# Patient Record
Sex: MALE | Race: WHITE
[De-identification: names, ages, dates, MRNs, and addresses within clinical notes are randomized per-mention and may not be internally consistent; named-entity substitution may affect disease eponyms.]

---

## 2023-04-14 ENCOUNTER — HOSPITAL ENCOUNTER (EMERGENCY)
Dept: HOSPITAL 94 - ER | Age: 43
LOS: 5 days | Discharge: PSYCHIATRIC OR SUBSTANCE USE FACILITY | End: 2023-04-19
Payer: COMMERCIAL

## 2023-04-14 VITALS — BODY MASS INDEX: 30.47 KG/M2 | HEIGHT: 66 IN | WEIGHT: 189.6 LBS

## 2023-04-14 DIAGNOSIS — F20.9: Primary | ICD-10-CM

## 2023-04-14 DIAGNOSIS — Z20.822: ICD-10-CM

## 2023-04-14 LAB
ALBUMIN SERPL BCP-MCNC: 3.7 G/DL (ref 3.4–5)
ALBUMIN/GLOB SERPL: 1.1 {RATIO} (ref 1.1–1.5)
ALP SERPL-CCNC: 81 IU/L (ref 46–116)
ALT SERPL W P-5'-P-CCNC: 20 U/L (ref 12–78)
ANION GAP SERPL CALCULATED.3IONS-SCNC: 8 MMOL/L (ref 8–16)
AST SERPL W P-5'-P-CCNC: 20 U/L (ref 10–37)
BASOPHILS # BLD AUTO: 0.1 X10'3 (ref 0–0.2)
BASOPHILS NFR BLD AUTO: 0.6 % (ref 0–1)
BILIRUB SERPL-MCNC: 0.2 MG/DL (ref 0.1–1)
BUN SERPL-MCNC: 9 MG/DL (ref 7–18)
BUN/CREAT SERPL: 12.5 (ref 10–20)
CALCIUM SERPL-MCNC: 8.7 MG/DL (ref 8.5–10.1)
CHLORIDE SERPL-SCNC: 105 MMOL/L (ref 99–107)
CO2 SERPL-SCNC: 26.7 MMOL/L (ref 24–32)
CREAT SERPL-MCNC: 0.72 MG/DL (ref 0.6–1.1)
EOSINOPHIL # BLD AUTO: 0.1 X10'3 (ref 0–0.9)
EOSINOPHIL NFR BLD AUTO: 0.8 % (ref 0–6)
ERYTHROCYTE [DISTWIDTH] IN BLOOD BY AUTOMATED COUNT: 14.2 % (ref 11.5–14.5)
ETHANOL SERPL-MCNC: < 0.01 GM/DL (ref 0–0.01)
GFR SERPL CREATININE-BSD FRML MDRD: > 90 ML/MIN
GLUCOSE SERPL-MCNC: 107 MG/DL (ref 70–104)
HCT VFR BLD AUTO: 40.9 % (ref 42–52)
HGB BLD-MCNC: 14.1 G/DL (ref 14–17.9)
LYMPHOCYTES # BLD AUTO: 2.3 X10'3 (ref 1.1–4.8)
LYMPHOCYTES NFR BLD AUTO: 24.1 % (ref 21–51)
MCH RBC QN AUTO: 31.6 PG (ref 27–31)
MCHC RBC AUTO-ENTMCNC: 34.5 G/DL (ref 33–36.5)
MCV RBC AUTO: 91.7 FL (ref 78–98)
MONOCYTES # BLD AUTO: 0.6 X10'3 (ref 0–0.9)
MONOCYTES NFR BLD AUTO: 6.1 % (ref 2–12)
NEUTROPHILS # BLD AUTO: 6.5 X10'3 (ref 1.8–7.7)
NEUTROPHILS NFR BLD AUTO: 68.4 % (ref 42–75)
PLATELET # BLD AUTO: 230 X10'3 (ref 140–440)
PMV BLD AUTO: 7.7 FL (ref 7.4–10.4)
POTASSIUM SERPL-SCNC: 3.7 MMOL/L (ref 3.5–5.1)
PROT SERPL-MCNC: 7 G/DL (ref 6.4–8.2)
RBC # BLD AUTO: 4.46 X10'6 (ref 4.7–6.1)
SODIUM SERPL-SCNC: 140 MMOL/L (ref 135–145)
WBC # BLD AUTO: 9.4 X10'3 (ref 4.5–11)

## 2023-04-14 PROCEDURE — 84443 ASSAY THYROID STIM HORMONE: CPT

## 2023-04-14 PROCEDURE — 85025 COMPLETE CBC W/AUTO DIFF WBC: CPT

## 2023-04-14 PROCEDURE — 80305 DRUG TEST PRSMV DIR OPT OBS: CPT

## 2023-04-14 PROCEDURE — 99285 EMERGENCY DEPT VISIT HI MDM: CPT

## 2023-04-14 PROCEDURE — 87811 SARS-COV-2 COVID19 W/OPTIC: CPT

## 2023-04-14 PROCEDURE — 36415 COLL VENOUS BLD VENIPUNCTURE: CPT

## 2023-04-14 PROCEDURE — 80053 COMPREHEN METABOLIC PANEL: CPT

## 2023-04-14 PROCEDURE — 96372 THER/PROPH/DIAG INJ SC/IM: CPT

## 2023-04-14 PROCEDURE — 80320 DRUG SCREEN QUANTALCOHOLS: CPT

## 2023-04-14 PROCEDURE — 81003 URINALYSIS AUTO W/O SCOPE: CPT

## 2023-04-14 NOTE — NUR
PT BECOMING EXTREEMLY AGGITATED BECAUSE HE IS NOT BEING ALOWED TO GO OUT AND 
HAVE A CIGARETT.  IT WAS EXPLAINED THAT THIS IS A NON-SMOKING HOSPITAL, PT 
BEGAN YELLING, KICKING GURLISETTE AND THREW HIS SUNGLASSES AT ONE OF THE EMTS THAT 
BROUGHT HIM IN.



OBTAINED AN ORDER FOR MEDICATION; PROVIDER ORDERED A B502 AND IT WAS 
ADMINISTERED

## 2023-04-15 LAB
AMPHETAMINES UR QL SCN: NEGATIVE
BARBITURATES UR QL SCN: NEGATIVE
BENZODIAZ UR QL SCN: NEGATIVE
BZE UR QL SCN: NEGATIVE
CANNABINOIDS UR QL SCN: NEGATIVE
CLARITY UR: CLEAR
COLOR UR: (no result)
GLUCOSE UR STRIP-MCNC: NEGATIVE MG/DL
HGB UR QL STRIP: NEGATIVE
KETONES UR STRIP-MCNC: NEGATIVE MG/DL
LEUKOCYTE ESTERASE UR QL STRIP: NEGATIVE
METHADONE UR QL SCN: NEGATIVE
NITRITE UR QL STRIP: NEGATIVE
OPIATES UR QL SCN: NEGATIVE
PCP UR QL SCN: NEGATIVE
PH UR STRIP: 7.5 [PH] (ref 4.8–8)
PROT UR QL STRIP: NEGATIVE MG/DL
SP GR UR STRIP: 1.01 (ref 1–1.03)
URN COLLECT METHOD CLASS: (no result)
UROBILINOGEN UR STRIP-MCNC: 0.2 E.U/DL (ref 0.2–1)

## 2023-04-15 RX ADMIN — DOCUSATE SODIUM SCH MG: 100 CAPSULE, LIQUID FILLED ORAL at 20:11

## 2023-04-15 NOTE — NUR
pt requested for someone to check his belongings to see if his red belt and his 
wallet were in there.  Tech checked the bags of pt belongings.  Yenni was unable 
to locate said wallet and red belt.  Tech then called [facility] in an attempt 
to locate the red belt and wallet, gentleman that answered the phone stated 
that they had it and the pt's conservator would be able to pick it up upon pt 
departure back to their original county.

## 2023-04-15 NOTE — NUR
Pt got agitated stating "I asked for coffe and noone f***ing listens!" Writer 
showed him where his cup was and said he was sleeping and writer didn't want to 
wake him up. Pt stated "Oh, I will drink the coffee."

## 2023-04-15 NOTE — NUR
Spoke with Jennifer at [facility] to clarify pt's eMAR that was sent from facility. 
Pt's Lithium and Trazadone were discontinued r/t doctor was concerned with 
interaction with Haldol. Pt had been receiving mulitiple IM's of Haldol. Pt has 
a history seizures and refused Depakote, pt was then placed on Trileptal, which 
he had been taking at [facility]. Pt is now on Trileptal 450mg BID, however pt 
refused his morning dose saying "I only had grad mal's when I was taking the 
Zyprexa." "Since I don't take Zyprexa I don't need seizure medication."

## 2023-04-15 NOTE — NUR
[facility] at bedside, when the name of [facility] comes up pt becomes agitated "I am 
going to brandon that place!"

## 2023-04-15 NOTE — NUR
-------------------------------------------------------------------------------

           *** Note undone in [facility] - 04/15/23 at 1650 by BHARAT ***            

-------------------------------------------------------------------------------

Pt back on unit.

## 2023-04-15 NOTE — NUR
Received patient at 0700 to OF bed #25. Pt required security for escort r/t his 
agressive behavior about being woke up. Pt yelled for a short time frustrated 
"because they were going to give me more than 100mg of Seroquel!" "What the 
hell, they wanted me fall down!" Pt screamed "my god is here will come!" Pt 
then said "I am not going to hurt you or anyone else, I'm just not going to 
take more then 100mg of Seroquel."

## 2023-04-15 NOTE — NUR
Pt has random loud outbursts "When the rapture comes you all will pay the 
gong!" Pt is angry with [place] "They gave me Seroquel and I fell down on 
camera!" "I want a malpractice  and if I don't get one someone will pay!" 
Pt refused his breakfast yelling "That is poisoned!" However he did drink his 
coffee.

## 2023-04-15 NOTE — NUR
PATIENT SAID HE HAD TO PEE ,SO TECH GAVE HIM A URINAL BECAUSE WE NEEDED A 
SIMPLE NOT  AND THE PATIENT THROW IT ON THE FLOOR AND SAID HE WAS NOT GOING TO 
PEE IN AND THAT HE WAS GOING IN THE SINK .PATIENT STATED THAT HE WAS NOT GOING 
TO GIVE A SIMPLE

## 2023-04-15 NOTE — NUR
Pt cooperative with vitals. Asked about HS meds and laughed when writer told 
him it was only 1730.  No outbursts.

## 2023-04-15 NOTE — NUR
Pt became irritated at another Pt who was yelling a lot. Pt able to be 
redirected and did not escalate the situation. Pt took some of his HS meds, 
refusing the Seroquel 200mg and the trileptal. Pt remained in his bed area and 
attempted to rest.

## 2023-04-15 NOTE — NUR
Pt ate dinner and given cup of decaf coffee and he was smiling. Pt currently 
cooperative and not having loud outbursts. In bed resting.

## 2023-04-16 RX ADMIN — DOCUSATE SODIUM SCH MG: 100 CAPSULE, LIQUID FILLED ORAL at 20:34

## 2023-04-16 RX ADMIN — DOCUSATE SODIUM SCH MG: 100 CAPSULE, LIQUID FILLED ORAL at 08:00

## 2023-04-16 RX ADMIN — Medication SCH PACKET: at 12:44

## 2023-04-16 RX ADMIN — THERA TABS SCH EACH: TAB at 08:00

## 2023-04-16 NOTE — NUR
Assumed care of patient. Pt is lying on his bed with his arms pulled into his 
shirt. Pt appears to be sleeping, so writer observed. [place] placed over 
patient. Respirations even and unlabored.

## 2023-04-16 NOTE — NUR
Pt was restless, lying in bed when greeted. Pt took his melatonin and colace, 
stated "I will take my Seroquel in an hour, but only 100mg." Pt given coffee as 
requested, pt up to bathroom then back to sleep.

## 2023-04-16 NOTE — NUR
-------------------------------------------------------------------------------

          *** Note undone in ED - 04/16/23 at 0843 by KAMI ***           

-------------------------------------------------------------------------------

[facility] transport here to take pt to [facility].  Pt left with all 
belongings. Father accompanied pt to [facility]

## 2023-04-16 NOTE — NUR
Pt began talking about a med that previously caused him a seizure, and that he 
has the name of a  amongst his belongings.  He asked for his belongings 
and stated that he was going to leave.  I reminded him he is on a 5150 and that 
he can't leave. This upset him, and he slammed [employer]'s computer to the desk. 
Security and 2 ER main male staff som watt called and a B52 was obtained from 
Kizzy Nolasco NP.  However on arrival back into [facility], security had 
calmed pt down, and was no longer acting violent and so the B52 was not given.  
Pt had also refused his AM oral meds.  Pt currently at desk asking questions 
about why he is on a 5150, and how he can get a  to assist with getting 
him off the 5150 and out of the hospital.  Discussed with him that this is a 
holding place and to discuss it with the Mental Health team.

## 2023-04-16 NOTE — NUR
Pt refused his 0800 meds.  Explained to him the necessity, however pt still 
refused.  Will try again later and continue to monitor pt.

## 2023-04-16 NOTE — NUR
Pt pacing and talking to himself.  I asked if he needed an ativan, and he 
stated that yes, he was feeling anxious and would accept one.  He had earlier 
refused any med that isn't "natural".

## 2023-04-16 NOTE — NUR
Pt in room yelling " oh devil get out of the way" over and over again. Said he 
will not take anymore meds.

## 2023-04-17 RX ADMIN — Medication SCH PACKET: at 12:07

## 2023-04-17 RX ADMIN — THERA TABS SCH EACH: TAB at 09:55

## 2023-04-17 RX ADMIN — DOCUSATE SODIUM SCH MG: 100 CAPSULE, LIQUID FILLED ORAL at 20:11

## 2023-04-17 RX ADMIN — DOCUSATE SODIUM SCH MG: 100 CAPSULE, LIQUID FILLED ORAL at 09:55

## 2023-04-17 NOTE — NUR
The patient has been friendly and social with staff and peers. He is mildly 
intrussive but accepting redirection. He wanted to refuse his medications but 
did end up taking all of her medications.

## 2023-04-17 NOTE — NUR
Pt has been passing unit, labile at times. Pt has refusued all his medications 
stating "I am better I don't medication any more." "Yippee!" Pt presents with 
flight of ideas, continues to be upset saying he "should have been off 
conservatorship 6 months ago." Talks about getting a "malpractice  and 
suing everyone." Pt then says (talking about his medications) "It was sent from 
the devil to screw me up." "I am a man of peace, not a man of war." Pt appear 
hypomanic at times. Writer attempted to explain what being conserved  meant. 
Pt's behavior escalated, not being able to redirect him back to his room or Pt 
is threatening to another peer, repeating words she says or yelling back at 
her. Pt continues to refuse an oral intervention. Pt began snapping his fingers 
and clapping extremely loud and wouldn't stop when asked. Behavior is 
escalating mood on unit and other patient behavior.  Security was called and 
verbal deescalation was ineffective. Pt was given a choice  of PO medication or 
IM. "Well I am f ***ing not going to take any pills!" "Do what you want!" 
Received order for IM Haldol 5, Benadryl 50, Ativan 2.

## 2023-04-17 NOTE — NUR
Pt woke and ate his lunch, overheard pt telling tech that he would take his 
medication up until he "talked to his  about being off 
conservatorship." Pt has no outbursts.

## 2023-04-17 NOTE — NUR
security offericers are still at bedside waiting for RN to come back with a 
shot



RN just gave shot at 1015am

## 2023-04-17 NOTE — NUR
AVOID GIVING PATIENT HALDOL UNTIL A DESCREPANCY CAN BE CLARIFIED. Clarification 
if Haldol Decanoate IM 100mg/mL was administered or regular Haldol 10mg was 
administered on the date of 4/16/23. Writer notified DANNA Encarnacion Director. 
Incident report filed.

## 2023-04-17 NOTE — NUR
Administered IM medication after oral medication was refused again. Pt yelled 
"I am going to brandon all of you!" He then rolled to his left side and accepted 
the medication. Security was at bedside with writer.

## 2023-04-17 NOTE — NUR
Lyndon -  for the conservatorship program in [employer]. His 
number is . He stated they are working on "plan B's" to get patient 
back in [facility]. Lyndon reports patient has never been offensively violent, he 
has a loud bark.

## 2023-04-17 NOTE — NUR
patient was being obnoxious and security was called and as of now is at bedside 
and patient is going to get a shot to calm him down.

## 2023-04-18 RX ADMIN — Medication SCH PACKET: at 12:10

## 2023-04-18 RX ADMIN — DOCUSATE SODIUM SCH MG: 100 CAPSULE, LIQUID FILLED ORAL at 19:48

## 2023-04-18 RX ADMIN — DOCUSATE SODIUM SCH MG: 100 CAPSULE, LIQUID FILLED ORAL at 08:00

## 2023-04-18 RX ADMIN — THERA TABS SCH EACH: TAB at 08:00

## 2023-04-18 NOTE — NUR
-------------------------------------------------------------------------------

            *** Note jaimee in ED - 04/18/23 at 1101 by AUBREE ***            

-------------------------------------------------------------------------------

Patient sleeping on his left side. No distress observed.  Continue to monitor.

## 2023-04-18 NOTE — NUR
The patient has been impulsive but redirectable. His insight and judgement are 
impaired. He is mildly intrussive at times.

## 2023-04-18 NOTE — NUR
The patient is pleasant and resting on his bed watching TV. He had an HS snack. 
He was compliant with his medications. He was made aware that he would be 
[place].

## 2023-04-18 NOTE — NUR
Patient ambulatory to  and patient came out and he pants were wet.  Patient 
stated he accidently sprayed on his pants.  Tech gave patient a clean pair of 
sweats.  Patient is calm and cooperative.  Continue to monitor.

## 2023-04-18 NOTE — NUR
PT LEAVING 4/19 BETWEEN 1330 AND 1430. TRANSPORT TO  PATIENT AND TAKE TO 
[facility] [place] [address] [facility] [place]  
[address]  JUAN C, [phone].

## 2023-04-18 NOTE — NUR
Another patient was crying and patient was poking fun at other patient.  RN 
asked patient to leave her alone and patient got angry and started cussing.  
Patient continues to be agitated.  Continue to monitor.

## 2023-04-18 NOTE — NUR
RN gave patient his morning medication. Patient attempted to refuse medication. 
 RN reminded him that he is conserved and she would give patient an injection 
if he refused to take his medication.  Patient took his medication.  Continue 
to monitor.

## 2023-04-19 VITALS — SYSTOLIC BLOOD PRESSURE: 101 MMHG | DIASTOLIC BLOOD PRESSURE: 59 MMHG

## 2023-04-19 RX ADMIN — THERA TABS SCH EACH: TAB at 08:38

## 2023-04-19 RX ADMIN — DOCUSATE SODIUM SCH MG: 100 CAPSULE, LIQUID FILLED ORAL at 08:39

## 2023-04-19 RX ADMIN — Medication SCH PACKET: at 12:00
